# Patient Record
Sex: FEMALE | Race: WHITE
[De-identification: names, ages, dates, MRNs, and addresses within clinical notes are randomized per-mention and may not be internally consistent; named-entity substitution may affect disease eponyms.]

---

## 2020-04-27 ENCOUNTER — HOSPITAL ENCOUNTER (EMERGENCY)
Dept: HOSPITAL 53 - M ED | Age: 30
LOS: 1 days | Discharge: HOME | End: 2020-04-28
Payer: COMMERCIAL

## 2020-04-27 DIAGNOSIS — F17.200: ICD-10-CM

## 2020-04-27 DIAGNOSIS — Z91.010: ICD-10-CM

## 2020-04-27 DIAGNOSIS — Z79.899: ICD-10-CM

## 2020-04-27 DIAGNOSIS — I45.6: ICD-10-CM

## 2020-04-27 DIAGNOSIS — I47.1: ICD-10-CM

## 2020-04-27 DIAGNOSIS — Z86.711: ICD-10-CM

## 2020-04-27 DIAGNOSIS — R51: Primary | ICD-10-CM

## 2020-04-27 DIAGNOSIS — R53.83: ICD-10-CM

## 2020-04-27 LAB
B-HCG SERPL QL: NEGATIVE
BASE EXCESS BLDV CALC-SCNC: -1.6 MMOL/L (ref -2–2)
BASOPHILS # BLD AUTO: 0 10^3/UL (ref 0–0.2)
BASOPHILS NFR BLD AUTO: 0.8 % (ref 0–1)
BUN SERPL-MCNC: 7 MG/DL (ref 7–18)
CALCIUM SERPL-MCNC: 9 MG/DL (ref 8.5–10.1)
CHLORIDE SERPL-SCNC: 107 MEQ/L (ref 98–107)
CO2 BLDV CALC-SCNC: 26.9 MEQ/L (ref 24–28)
CO2 SERPL-SCNC: 32 MEQ/L (ref 21–32)
CREAT SERPL-MCNC: 0.7 MG/DL (ref 0.55–1.3)
EOSINOPHIL # BLD AUTO: 0.1 10^3/UL (ref 0–0.5)
EOSINOPHIL NFR BLD AUTO: 1.9 % (ref 0–3)
FT4I SERPL CALC-MCNC: 2.7 % (ref 1.3–4.8)
GFR SERPL CREATININE-BSD FRML MDRD: > 60 ML/MIN/{1.73_M2} (ref 60–?)
GLUCOSE SERPL-MCNC: 93 MG/DL (ref 70–100)
HCO3 BLDV-SCNC: 25.3 MEQ/L (ref 23–27)
HCO3 STD BLDV-SCNC: 22.3 MEQ/L
HCT VFR BLD AUTO: 39.9 % (ref 36–47)
HGB BLD-MCNC: 13.3 G/DL (ref 12–15.5)
LYMPHOCYTES # BLD AUTO: 2 10^3/UL (ref 1.5–5)
LYMPHOCYTES NFR BLD AUTO: 38.8 % (ref 24–44)
MCH RBC QN AUTO: 29.3 PG (ref 27–33)
MCHC RBC AUTO-ENTMCNC: 33.3 G/DL (ref 32–36.5)
MCV RBC AUTO: 87.9 FL (ref 80–96)
MONOCYTES # BLD AUTO: 0.4 10^3/UL (ref 0–0.8)
MONOCYTES NFR BLD AUTO: 7.8 % (ref 0–5)
NEUTROPHILS # BLD AUTO: 2.6 10^3/UL (ref 1.5–8.5)
NEUTROPHILS NFR BLD AUTO: 50.3 % (ref 36–66)
PCO2 BLDV: 51.6 MMHG (ref 38–50)
PH BLDV: 7.31 UNITS (ref 7.33–7.43)
PLATELET # BLD AUTO: 231 10^3/UL (ref 150–450)
PO2 BLDV: 31.2 MMHG (ref 30–50)
POTASSIUM SERPL-SCNC: 3.8 MEQ/L (ref 3.5–5.1)
RBC # BLD AUTO: 4.54 10^6/UL (ref 4–5.4)
SAO2 % BLDV: 59.2 % (ref 60–80)
SODIUM SERPL-SCNC: 142 MEQ/L (ref 136–145)
T3RU NFR SERPL: 37 % (ref 30–39)
T4 SERPL-MCNC: 7.2 UG/DL (ref 4.5–12)
TSH SERPL DL<=0.005 MIU/L-ACNC: 1.65 UIU/ML (ref 0.36–3.74)
WBC # BLD AUTO: 5.2 10^3/UL (ref 4–10)

## 2020-04-28 VITALS — SYSTOLIC BLOOD PRESSURE: 129 MMHG | DIASTOLIC BLOOD PRESSURE: 82 MMHG

## 2020-04-29 ENCOUNTER — HOSPITAL ENCOUNTER (OUTPATIENT)
Dept: HOSPITAL 53 - M LABSMTC | Age: 30
End: 2020-04-29
Attending: FAMILY MEDICINE
Payer: COMMERCIAL

## 2020-04-29 DIAGNOSIS — Z11.59: Primary | ICD-10-CM

## 2020-04-29 DIAGNOSIS — Z20.828: ICD-10-CM

## 2020-08-13 ENCOUNTER — HOSPITAL ENCOUNTER (EMERGENCY)
Dept: HOSPITAL 53 - M ED | Age: 30
Discharge: HOME | End: 2020-08-13
Payer: SELF-PAY

## 2020-08-13 DIAGNOSIS — O99.341: ICD-10-CM

## 2020-08-13 DIAGNOSIS — Z3A.01: ICD-10-CM

## 2020-08-13 DIAGNOSIS — Z79.899: ICD-10-CM

## 2020-08-13 DIAGNOSIS — O26.891: Primary | ICD-10-CM

## 2020-08-13 DIAGNOSIS — O99.411: ICD-10-CM

## 2020-08-13 DIAGNOSIS — I45.6: ICD-10-CM

## 2020-08-13 DIAGNOSIS — Z91.018: ICD-10-CM

## 2020-08-13 DIAGNOSIS — R10.9: ICD-10-CM

## 2020-08-13 DIAGNOSIS — F90.9: ICD-10-CM

## 2020-10-28 ENCOUNTER — HOSPITAL ENCOUNTER (EMERGENCY)
Dept: HOSPITAL 53 - M ED | Age: 30
Discharge: HOME | End: 2020-10-28
Payer: COMMERCIAL

## 2020-10-28 VITALS — HEIGHT: 71 IN | BODY MASS INDEX: 24.41 KG/M2 | WEIGHT: 174.36 LBS

## 2020-10-28 VITALS — DIASTOLIC BLOOD PRESSURE: 73 MMHG | SYSTOLIC BLOOD PRESSURE: 132 MMHG

## 2020-10-28 DIAGNOSIS — Z3A.17: ICD-10-CM

## 2020-10-28 DIAGNOSIS — R10.2: ICD-10-CM

## 2020-10-28 DIAGNOSIS — O99.112: ICD-10-CM

## 2020-10-28 DIAGNOSIS — R55: ICD-10-CM

## 2020-10-28 DIAGNOSIS — D68.2: ICD-10-CM

## 2020-10-28 DIAGNOSIS — Z79.899: ICD-10-CM

## 2020-10-28 DIAGNOSIS — R51.9: ICD-10-CM

## 2020-10-28 LAB
ALBUMIN SERPL BCG-MCNC: 3.2 GM/DL (ref 3.2–5.2)
ALT SERPL W P-5'-P-CCNC: 24 U/L (ref 12–78)
APTT BLD: 26.4 SECONDS (ref 24.2–38.5)
BASOPHILS # BLD AUTO: 0 10^3/UL (ref 0–0.2)
BASOPHILS NFR BLD AUTO: 0.1 % (ref 0–1)
BILIRUB CONJ SERPL-MCNC: < 0.1 MG/DL (ref 0–0.2)
BILIRUB SERPL-MCNC: 0.3 MG/DL (ref 0.2–1)
BUN SERPL-MCNC: 7 MG/DL (ref 7–18)
CALCIUM SERPL-MCNC: 8.3 MG/DL (ref 8.5–10.1)
CHLORIDE SERPL-SCNC: 106 MEQ/L (ref 98–107)
CO2 SERPL-SCNC: 25 MEQ/L (ref 21–32)
CREAT SERPL-MCNC: 0.58 MG/DL (ref 0.55–1.3)
EOSINOPHIL # BLD AUTO: 0.1 10^3/UL (ref 0–0.5)
EOSINOPHIL NFR BLD AUTO: 1.3 % (ref 0–3)
GFR SERPL CREATININE-BSD FRML MDRD: > 60 ML/MIN/{1.73_M2} (ref 60–?)
GLUCOSE SERPL-MCNC: 81 MG/DL (ref 70–100)
HCT VFR BLD AUTO: 32.7 % (ref 36–47)
HGB BLD-MCNC: 10.7 G/DL (ref 12–15.5)
INR PPP: 0.97
LIPASE SERPL-CCNC: 66 U/L (ref 73–393)
LYMPHOCYTES # BLD AUTO: 1.2 10^3/UL (ref 1.5–5)
LYMPHOCYTES NFR BLD AUTO: 18 % (ref 24–44)
MCH RBC QN AUTO: 28.8 PG (ref 27–33)
MCHC RBC AUTO-ENTMCNC: 32.7 G/DL (ref 32–36.5)
MCV RBC AUTO: 88.1 FL (ref 80–96)
MONOCYTES # BLD AUTO: 0.6 10^3/UL (ref 0–0.8)
MONOCYTES NFR BLD AUTO: 8.9 % (ref 0–5)
NEUTROPHILS # BLD AUTO: 4.8 10^3/UL (ref 1.5–8.5)
NEUTROPHILS NFR BLD AUTO: 71.3 % (ref 36–66)
PLATELET # BLD AUTO: 221 10^3/UL (ref 150–450)
POTASSIUM SERPL-SCNC: 3.6 MEQ/L (ref 3.5–5.1)
PROT SERPL-MCNC: 6.5 GM/DL (ref 6.4–8.2)
PROTHROMBIN TIME: 13.1 SECONDS (ref 12.5–14.3)
RBC # BLD AUTO: 3.71 10^6/UL (ref 4–5.4)
SODIUM SERPL-SCNC: 139 MEQ/L (ref 136–145)
WBC # BLD AUTO: 6.7 10^3/UL (ref 4–10)

## 2020-10-28 NOTE — REPVR
PROCEDURE INFORMATION: 

Exam: US Pregnancy After First Trimester, Transabdominal 

Exam date and time: 10/28/2020 4:47 AM 

Age: 30 years old 

Clinical indication: Lmp or gestational age (in weeks): 6/17/20; Antepartum 

complications; Bleeding; Pregnant; Additional info: Vaginal bleeding 



TECHNIQUE: 

Imaging protocol: Real-time transabdominal obstetrical ultrasound of the 

maternal pelvis and a second or third trimester pregnancy with image 

documentation. 



COMPARISON: 

No relevant prior studies available. 



FINDINGS: 

Gestation: Single viable intrauterine gestation. 

Fetal heart rate: Fetal heart rate is 139 bpm. 

Presentation: Vertex presentation. 

Placenta: Anterior placenta. 

Amniotic fluid: Amniotic fluid is normal for gestational age. 



FETAL ANATOMY: 

Midline falx: Normal 

Cerebellum: Normal 

Cerebral ventricles: Normal 

Cisterna magna: Normal 

Septum pellucidum: Normal 

Upper lip and nose: Obscured by position 

Heart four-chamber view, heart size and position: Normal 

Fetal kidneys: Normal 

Fetal stomach: Normal 

Fetal urinary bladder: Normal 

Spine: Normal 

Umbilical cord insertion site into the fetal abdomen: Obscured by position 

Umbilical cord vessel number: Obscured by position 

Arms and hands: Normal 

Legs and feet: Normal 

Fetal external genitalia: Obscured by position  



BIOMETRY: 

Gestational age (AUA): Sonographically estimated gestational age is 17 weeks 5 

days. 

Estimated due date (AUA): Estimated date of delivery is 04/02/2021. 

Estimated fetal weight: Estimated fetal weight is 201 g, 53rd percentile. 

Biparietal diameter: 3.9 cm 

Head circumference: 14.1 cm 



MATERNAL ANATOMY: 

Uterus: Unremarkable. 

Cervix: Cervical length is 3.7 cm. Internal cervical os is closed. 

Right adnexa: Ovary is obscured by overlying bowel gas.  

Left adnexa: Ovary is obscured by overlying bowel gas.  



IMPRESSION: 

Single viable intrauterine gestation 17 weeks 5 days of age.



Electronically signed by: Maximiliano Zambrano On 10/28/2020  05:44:50 AM

## 2020-10-29 NOTE — ECGEPIP
[facility] - ED

                                       

                                       Test Date:    2020-10-28

Pat Name:     ARDEN ESPINOZA           Department:   

Patient ID:   [id number]                 Room:         -

Gender:       Female                   Technician:   

:          1990               Requested By: CONCEPCIÓN ROSE

Order Number: [id number]     Reading MD:   Nora Liriano

                                 Measurements

Intervals                              Axis          

Rate:         86                       P:            28

NC:           134                      QRS:          12

QRSD:         91                       T:            19

QT:           362                                    

QTc:          435                                    

                           Interpretive Statements

SINUS RHYTHM

No prior

Electronically Signed on 10- 7:57:02 EDT by Nora Liriano